# Patient Record
Sex: MALE | Race: WHITE | URBAN - METROPOLITAN AREA
[De-identification: names, ages, dates, MRNs, and addresses within clinical notes are randomized per-mention and may not be internally consistent; named-entity substitution may affect disease eponyms.]

---

## 2018-01-20 ENCOUNTER — OFFICE VISIT (OUTPATIENT)
Dept: URGENT CARE | Facility: RETAIL CLINIC | Age: 63
End: 2018-01-20
Payer: COMMERCIAL

## 2018-01-20 VITALS
OXYGEN SATURATION: 96 % | DIASTOLIC BLOOD PRESSURE: 61 MMHG | HEART RATE: 80 BPM | SYSTOLIC BLOOD PRESSURE: 96 MMHG | TEMPERATURE: 99.5 F

## 2018-01-20 DIAGNOSIS — J20.9 ACUTE BRONCHITIS WITH COEXISTING CONDITION REQUIRING PROPHYLACTIC TREATMENT: Primary | ICD-10-CM

## 2018-01-20 PROCEDURE — 99203 OFFICE O/P NEW LOW 30 MIN: CPT | Performed by: PHYSICIAN ASSISTANT

## 2018-01-20 RX ORDER — AZITHROMYCIN 250 MG/1
TABLET, FILM COATED ORAL
Qty: 6 TABLET | Refills: 0 | Status: SHIPPED | OUTPATIENT
Start: 2018-01-20 | End: 2018-01-24

## 2018-01-20 NOTE — PATIENT INSTRUCTIONS
Take antibiotic as directed  Continue codeine cough medication every 4-6 hrs as needed  Can consider also taking mucinex 12 hr (guaifenesin 600mg) 1 tablet during day to help thin secretions  Honey mixed with warm liquids  Fluids, rest, cough drops, humidifier, over the counter pain relievers as needed  Please follow up with primary care provider if not improving, worsening or new symptoms or for any adverse reactions to medications.   May need chest xray if persistent symptoms.

## 2018-01-20 NOTE — NURSING NOTE
Chief Complaint   Patient presents with     Cough     5-6 days; chest congestion, sore lungs; would like lungs checked following visit at pcp     Fatigue     coughing wears him out, but feels ok otherwise       Initial BP 96/61 (BP Location: Left arm)  Pulse 80  Temp 99.5  F (37.5  C) (Oral)  SpO2 96% There is no height or weight on file to calculate BMI.  Medication Reconciliation: complete

## 2018-01-20 NOTE — MR AVS SNAPSHOT
"              After Visit Summary   2018    Kyle De León    MRN: [id number]           Patient Information     Date Of Birth          1955        Visit Information        Provider Department      2018 1:00 PM Angie Parmar PA-C [facility]        Today's Diagnoses     Acute bronchitis with coexisting condition requiring prophylactic treatment    -  1      Care Instructions    Take antibiotic as directed  Continue codeine cough medication every 4-6 hrs as needed  Can consider also taking mucinex 12 hr (guaifenesin 600mg) 1 tablet during day to help thin secretions  Honey mixed with warm liquids  Fluids, rest, cough drops, humidifier, over the counter pain relievers as needed  Please follow up with primary care provider if not improving, worsening or new symptoms or for any adverse reactions to medications.   May need chest xray if persistent symptoms.           Follow-ups after your visit        Who to contact     You can reach your care team any time of the day by calling [phone].  Notification of test results:  If you have an abnormal lab result, we will notify you by phone as soon as possible.         Additional Information About Your Visit        MyChart Information     [employer] lets you send messages to your doctor, view your test results, renew your prescriptions, schedule appointments and more. To sign up, go to [email or web address] . Click on \"Log in\" on the left side of the screen, which will take you to the Welcome page. Then click on \"Sign up Now\" on the right side of the page.     You will be asked to enter the access code listed below, as well as some personal information. Please follow the directions to create your username and password.     Your access code is: [email or web address]  Expires: 2018  2:15 PM     Your access code will  in 90 days. If you need help or a new code, please call your [place] clinic or [phone].        Care EveryWhere " ID     This is your Care EveryWhere ID. This could be used by other organizations to access your [place] medical records  [id number]        Your Vitals Were     Pulse Temperature Pulse Oximetry             80 99.5  F (37.5  C) (Oral) 96%          Blood Pressure from Last 3 Encounters:   01/20/18 96/61    Weight from Last 3 Encounters:   No data found for Wt              Today, you had the following     No orders found for display         Today's Medication Changes          These changes are accurate as of: 1/20/18  2:15 PM.  If you have any questions, ask your nurse or doctor.               Start taking these medicines.        Dose/Directions    azithromycin 250 MG tablet   Commonly known as:  ZITHROMAX   Used for:  Acute bronchitis with coexisting condition requiring prophylactic treatment        Two tablets first day, then one tablet daily for four days.   Quantity:  6 tablet   Refills:  0            Where to get your medicines      These medications were sent to [id number] - [place], [place] - [address]  [address] [address] [address]     Phone:  [phone]     azithromycin 250 MG tablet                Primary Care Provider Office Phone # Fax #    Zenobia Naik [phone]       [facility] [address] [facility] [address]        Equal Access to Services     TRINH DESAI : Vicente oconnor Somauri, wakristinada lugulshan, qaybta kaalmada aderose marieyada, misty whalen. So [facility] [phone].    ATENCIÓN: Si habla español, tiene a calixto disposición servicios gratuitos de asistencia lingüística. Llame al [phone].    We comply with applicable federal civil rights laws and [place] laws. We do not discriminate on the basis of race, color, national origin, age, disability, sex, sexual orientation, or gender identity.            Thank you!     Thank you for choosing [facility]  for your care. Our goal is always to provide  you with excellent care. Hearing back from our patients is one way we can continue to improve our services. Please take a few minutes to complete the written survey that you may receive in the mail after your visit with us. Thank you!             Your Updated Medication List - Protect others around you: Learn how to safely use, store and throw away your medicines at www.disposemymeds.org.          This list is accurate as of: 1/20/18  2:15 PM.  Always use your most recent med list.                   Brand Name Dispense Instructions for use Diagnosis    azithromycin 250 MG tablet    ZITHROMAX    6 tablet    Two tablets first day, then one tablet daily for four days.    Acute bronchitis with coexisting condition requiring prophylactic treatment       LISINOPRIL PO

## 2018-01-20 NOTE — PROGRESS NOTES
Chief Complaint   Patient presents with     Cough     5-6 days; chest congestion, sore lungs; would like lungs checked following visit at pcp     Fatigue     coughing wears him out, but feels ok otherwise        SUBJECTIVE:  Kyle De León is a 62 year old male who presents to the clinic today with a chief complaint of cough  for 6 day(s).  His cough is described as persistent and sometimes productive.    The patient's symptoms are moderate and worsening.  Associated symptoms include body aches/fever earlier on. The patient's symptoms are exacerbated by no particular triggers  Patient has been using codeine cough med prescribed earlier this week, fluids to improve symptoms.  History of bronchitis and pneumonia    Past Medical History:   Diagnosis Date     Hypertension      Current Outpatient Prescriptions   Medication Sig Dispense Refill     LISINOPRIL PO        azithromycin (ZITHROMAX) 250 MG tablet Two tablets first day, then one tablet daily for four days. 6 tablet 0      No Known Allergies     History   Smoking Status     Not on file   Smokeless Tobacco     Not on file       ROS  CONSTITUTIONAL:POSITIVE  for fever and body aches earlier, resolved now  ENT/MOUTH: POSITIVE for slight nasal drainage NEG sore throat or ear pain  RESP:POSITIVE for cough  NEG wheezing    OBJECTIVE:  BP 96/61 (BP Location: Left arm)  Pulse 80  Temp 99.5  F (37.5  C) (Oral)  SpO2 96%  GENERAL APPEARANCE: alert, mildly ill appearing  EYES: conjunctiva clear  HENT: ear canals and TM's normal.  Nose boggy, pink. mouth without ulcers, erythema or lesions  NECK: supple, nontender, no lymphadenopathy  RESP: exp rhonchi scattered in posterior lungs, no rales or wheezing  CV: regular rates and rhythm, normal S1 S2, no murmur noted  SKIN: no suspicious lesions or rashes    ASSESSMENT:    (J20.9) Acute bronchitis with coexisting condition requiring prophylactic treatment  (primary encounter diagnosis)     PLAN:  Plan: azithromycin (ZITHROMAX)  250 MG tablet  Has Rx from another provider for Robitussin AC/codeine cough medication  Take antibiotic as directed  Continue codeine cough medication every 4-6 hrs as needed  Can consider also taking just 1 mucinex 12 hr (guaifenesin 600mg, not 1200mg) 1 tablet during day to help thin secretions  Honey mixed with warm liquids  Fluids, rest, cough drops, humidifier, over the counter pain relievers as needed  Please follow up with primary care provider if not improving, worsening or new symptoms or for any adverse reactions to medications.   May need chest xray if persistent symptoms.     Angie Parmar PA-C  [facility]